# Patient Record
Sex: FEMALE | Race: WHITE | NOT HISPANIC OR LATINO | Employment: PART TIME
[De-identification: names, ages, dates, MRNs, and addresses within clinical notes are randomized per-mention and may not be internally consistent; named-entity substitution may affect disease eponyms.]

---

## 2017-02-19 ENCOUNTER — HOSPITAL ENCOUNTER (OUTPATIENT)
Dept: ADMINISTRATIVE | Facility: OTHER | Age: 37
Discharge: HOME | End: 2017-02-19
Attending: FAMILY MEDICINE | Admitting: FAMILY MEDICINE

## 2017-02-19 ASSESSMENT — MIFFLIN-ST. JEOR: SCORE: 1554.05

## 2020-01-18 ENCOUNTER — OFFICE VISIT (OUTPATIENT)
Dept: URGENT CARE | Facility: URGENT CARE | Age: 40
End: 2020-01-18
Payer: COMMERCIAL

## 2020-01-18 VITALS
RESPIRATION RATE: 17 BRPM | WEIGHT: 192 LBS | OXYGEN SATURATION: 97 % | TEMPERATURE: 98 F | DIASTOLIC BLOOD PRESSURE: 79 MMHG | HEART RATE: 81 BPM | SYSTOLIC BLOOD PRESSURE: 122 MMHG

## 2020-01-18 DIAGNOSIS — L50.9 HIVES: Primary | ICD-10-CM

## 2020-01-18 PROCEDURE — 99203 OFFICE O/P NEW LOW 30 MIN: CPT | Performed by: STUDENT IN AN ORGANIZED HEALTH CARE EDUCATION/TRAINING PROGRAM

## 2020-01-18 RX ORDER — DIAPER,BRIEF,INFANT-TODD,DISP
EACH MISCELLANEOUS
Qty: 56 G | Refills: 0 | Status: SHIPPED | OUTPATIENT
Start: 2020-01-18

## 2020-01-18 RX ORDER — PREDNISONE 20 MG/1
40 TABLET ORAL DAILY
Qty: 10 TABLET | Refills: 0 | Status: SHIPPED | OUTPATIENT
Start: 2020-01-18 | End: 2020-01-23

## 2020-01-18 RX ORDER — CETIRIZINE HYDROCHLORIDE 10 MG/1
10 TABLET ORAL DAILY
COMMUNITY

## 2020-01-18 NOTE — PROGRESS NOTES
"SUBJECTIVE:   Theresa Estes is a 39 year old female presenting with a chief complaint of   Chief Complaint   Patient presents with     Urgent Care     Hives     Pt states she has been having hives since 01/05/2020 patient states she saw her primary pcp on Monday but did not get any medication did change some items she uses but now they are getting worst all over body , \"stiff\" , red, raised no breathing issues. Pt is also 6 weeks post-partum.      40 yo female who is 6 weeks post partum here for evaluation of hives. Itching started on 1/5/2020 and then hives appeared. She saw her PCP for this issue this past week on Monday at which time she was started on Zyrtec. They discussed steroids but decided against this due to her breast feeding status and symptoms were not too severe. Yesterday, she noted that the hives seemed more red and raised and she now has some swelling on her left arm where the hives are worst. She has been having ongoing itching. No pain. No fever, chills, nausea, vomiting, diarrhea, stomach upset, lip/mouth/throat swelling, difficulty breathing, wheezing. She has never had anaphylaxis before. No history of hives previously. She has seasonal allergies and allergy to hazelnuts. She has been taking Zyrtec, oatmeal baths, topical aloe vera and topical Benadryl. Rash is present on her chest, arms and legs. She cannot recall a specific trigger. Denies any recent viral symptoms, new OTC medications, clothing. She did have a small amount of tree nut in a piece of candy in December but states that her typical reaction to this is mild. She also had a course of cefdinir before her symptoms started and wonders if this could have been the cause. She had allergy testing (skin prick) several years ago that she says was unremarkable.    A pertinent 10-point ROS was negative unless otherwise specified in the HPI.     History reviewed. No pertinent past medical history.  History reviewed. No pertinent family " history.  Current Outpatient Medications   Medication Sig Dispense Refill     cetirizine (ZYRTEC) 10 MG tablet Take 10 mg by mouth daily       hydrocortisone (CORTAID) 0.5 % external cream Apply to rash twice daily for up to 2 weeks as needed for rash. 56 g 0     predniSONE (DELTASONE) 20 MG tablet Take 2 tablets (40 mg) by mouth daily for 5 days 10 tablet 0     Prenatal Vit-DSS-Fe Cbn-FA (PRENATAL AD PO)        VITAMIN D, ERGOCALCIFEROL, PO        Social History     Tobacco Use     Smoking status: Never Smoker     Smokeless tobacco: Never Used   Substance Use Topics     Alcohol use: Not on file       OBJECTIVE  /79   Pulse 81   Temp 98  F (36.7  C) (Oral)   Resp 17   Wt 87.1 kg (192 lb)   SpO2 97%     GENERAL: No acute distress, non-toxic appearing  HEAD: Atraumatic, normocephalic  EYES: PERRL, EOMI, no scleral or conjunctival injection, anicteric  NOSE: Septum midline, no discharge  THROAT: Moist mucous membranes, oropharynx is patent, no tonsillar swelling, no mucosal swelling, no exudate or asymmetry, no oral lesions, voice is clear, no trismus  NECK: Supple with full ROM, trachea midline  CV: Regular rate and rhythm, no murmurs or rubs  LUNGS: Respirations unlabored, no wheezes, crackles or rales  SKIN:  Diffuse erythematous papular rash with wheals on her bilateral arms and legs and some diffuse subdermal swelling of the right forearm associated with urticaria   NEURO: Alert, coherent, interacts appropriately, no gross neurologic deficits  PSYCH: euthymic, normal affect, thought content is appropriate     Labs:  No results found for this or any previous visit (from the past 24 hour(s)).    ASSESSMENT & PLAN:      ICD-10-CM    1. Hives L50.9 predniSONE (DELTASONE) 20 MG tablet     hydrocortisone (CORTAID) 0.5 % external cream      38 yo postpartum, otherwise healthy female here for 2.5 weeks of hives recently evaluated by her PCP. She comes in today for worsening rash and some swelling under rash on  her right forearm. She is already taking Zyrtec. No symptoms or physical exam findings concerning for anaphylaxis and no prior history/known triggers for this. She does report history of allergy to tree nut that is mild. Unclear trigger for current symptoms although may be secondary to cefdinir and therefore I am adding that to her allergen list today. I do not think she needs epi-pen given the above history. We will treat with a prednisone burst and she already has follow up scheduled with her PCP. Continue Zyrtec and can try Benadryl as needed. Discussed risks of use with breast feeding and she will pump or feed formula with use of the medication. Discussed immediate evaluation in case of worsening symptoms or new shortness of breath, wheeze, GI symptoms, or mucosal involvement.     Followup: with PCP on Tuesday.    Options for treatment and/or follow-up care were reviewed with the patient who was engaged and actively involved in the decision making process and verbalized understanding of the options discussed and was satisfied with the final plan.       Marizol Reyes MD      Patient Instructions   Patient Education     Hives (Adult)  Hives are pink or red bumps on the skin. These bumps are also known as wheals. The bumps can itch, burn, or sting. Hives can occur anywhere on the body. They vary in size and shape and can form in clusters. Individual hives can appear and go away quickly. New hives may develop as old ones fade. Hives are common and usually harmless. Occasionally hives are a sign of a serious allergy.  Hives are often caused by an allergic reaction. It may be an allergic reaction to foods such as fruit, shellfish, chocolate, nuts, or tomatoes. It may be a reaction to pollens, animal fur, or mold spores. Medicines, chemicals, and insect bites can also cause hives. And hives can be caused by hot sun or cold air. The cause of hives can be difficult to find.  You may be given medicines to relieve  swelling and itching. Follow all instructions when using these medicines. The hives will usually fade in a few days, but can last up to 2 weeks.  Home care  Follow these tips:    Try to find the cause of the hives and eliminate it. Discuss possible causes with your healthcare provider. Future reactions to the same allergen may be worse.    Don t scratch the hives. Scratching will delay healing. To reduce itching, apply cool, wet compresses to the skin.    Dress in soft, loose cotton clothing.    Don t bathe in hot water. This can make the itching worse.    Apply an ice pack or cool pack wrapped in a thin towel to your skin. This will help reduce redness and itching. But if your hives were caused by exposure to cold, then do not apply more cold to them.    You may use over-the counter antihistamines to reduce itching. Some older antihistamines, such as diphenhydramine and chlorpheniramine, are inexpensive. But they need to be taken often and may make you sleepy. They are best used at bedtime. Don t use diphenhydramine if you have glaucoma or have trouble urinating because of an enlarged prostate. Newer antihistamines, such as loratadine, cetirizine, and fexofenadine, are generally more expensive. But they tend to have fewer side effects, such as drowsiness. They can be taken less often.    Another type of antihistamine is used to treat heartburn. This type includes ranitidine, nizatidine, famotidine, and cimetidine. These are sometimes used along with the above antihistamines if a single medicine is not working.  Follow-up care  Follow up with your healthcare provider if your symptoms don't get better in 2 days. Ask your provider about allergy testing if you have had a severe reaction, or have had several episodes of hives. He or she can use the allergy testing to find out what you are allergic to.  When to seek medical advice  Call your healthcare provider right away if any of these occur:    Fever of 100.4 F  (38.0 C) or higher, or as directed by your healthcare provider    Redness, swelling, or pain    Foul-smelling fluid coming from the rash  Call 911  Call 911 if any of the following occur:    Swelling of the face, throat, or tongue    Trouble breathing or swallowing    Dizziness, weakness, or fainting  Date Last Reviewed: 9/1/2016 2000-2019 The [employer]. [address] [place] [address]. All rights reserved. This information is not intended as a substitute for professional medical care. Always follow your healthcare professional's instructions.

## 2020-01-18 NOTE — PATIENT INSTRUCTIONS
Patient Education     Hives (Adult)  Hives are pink or red bumps on the skin. These bumps are also known as wheals. The bumps can itch, burn, or sting. Hives can occur anywhere on the body. They vary in size and shape and can form in clusters. Individual hives can appear and go away quickly. New hives may develop as old ones fade. Hives are common and usually harmless. Occasionally hives are a sign of a serious allergy.  Hives are often caused by an allergic reaction. It may be an allergic reaction to foods such as fruit, shellfish, chocolate, nuts, or tomatoes. It may be a reaction to pollens, animal fur, or mold spores. Medicines, chemicals, and insect bites can also cause hives. And hives can be caused by hot sun or cold air. The cause of hives can be difficult to find.  You may be given medicines to relieve swelling and itching. Follow all instructions when using these medicines. The hives will usually fade in a few days, but can last up to 2 weeks.  Home care  Follow these tips:    Try to find the cause of the hives and eliminate it. Discuss possible causes with your healthcare provider. Future reactions to the same allergen may be worse.    Don t scratch the hives. Scratching will delay healing. To reduce itching, apply cool, wet compresses to the skin.    Dress in soft, loose cotton clothing.    Don t bathe in hot water. This can make the itching worse.    Apply an ice pack or cool pack wrapped in a thin towel to your skin. This will help reduce redness and itching. But if your hives were caused by exposure to cold, then do not apply more cold to them.    You may use over-the counter antihistamines to reduce itching. Some older antihistamines, such as diphenhydramine and chlorpheniramine, are inexpensive. But they need to be taken often and may make you sleepy. They are best used at bedtime. Don t use diphenhydramine if you have glaucoma or have trouble urinating because of an enlarged prostate. Newer  antihistamines, such as loratadine, cetirizine, and fexofenadine, are generally more expensive. But they tend to have fewer side effects, such as drowsiness. They can be taken less often.    Another type of antihistamine is used to treat heartburn. This type includes ranitidine, nizatidine, famotidine, and cimetidine. These are sometimes used along with the above antihistamines if a single medicine is not working.  Follow-up care  Follow up with your healthcare provider if your symptoms don't get better in 2 days. Ask your provider about allergy testing if you have had a severe reaction, or have had several episodes of hives. He or she can use the allergy testing to find out what you are allergic to.  When to seek medical advice  Call your healthcare provider right away if any of these occur:    Fever of 100.4 F (38.0 C) or higher, or as directed by your healthcare provider    Redness, swelling, or pain    Foul-smelling fluid coming from the rash  Call 911  Call 911 if any of the following occur:    Swelling of the face, throat, or tongue    Trouble breathing or swallowing    Dizziness, weakness, or fainting  Date Last Reviewed: 9/1/2016 2000-2019 [employer]. [address] [place] [address], [place] [address]. All rights reserved. This information is not intended as a substitute for professional medical care. Always follow your healthcare professional's instructions.

## 2021-05-30 VITALS — WEIGHT: 196 LBS | HEIGHT: 64 IN | BODY MASS INDEX: 33.46 KG/M2

## 2021-06-08 NOTE — PROGRESS NOTES
Phone update given to Dr Knapp that Pt tolerating ice chips and sprite PO, VSS, and uterine ctxs vary from q 5 to 13 min and pt reports ctxs very mild and she does not feel all ctxs.Orders received to bolus remaining 700ml of LR and recheck UA.

## 2021-06-08 NOTE — PROGRESS NOTES
Discharge and Follow up instructions reviewed with Pt.Pt verbalizes understands. Dischagred to home ambulatory with .

## 2021-06-08 NOTE — PROGRESS NOTES
[id number] Phone update to Dr Knapp of urinalysis results and pt having occasional ctxs but not feeling them all and just occasionally feeling mild discomfort. Orders to discharge received.

## 2021-06-16 PROBLEM — Z34.90 PREGNANT: Status: ACTIVE | Noted: 2019-12-08

## 2021-06-16 PROBLEM — O99.820 GBS (GROUP B STREPTOCOCCUS CARRIER), +RV CULTURE, CURRENTLY PREGNANT: Status: ACTIVE | Noted: 2019-12-08

## 2021-06-16 PROBLEM — O99.119 THROMBOCYTOPENIA DURING PREGNANCY (H): Status: ACTIVE | Noted: 2019-12-08

## 2021-06-16 PROBLEM — O26.899 RH NEGATIVE STATUS DURING PREGNANCY: Status: ACTIVE | Noted: 2019-12-08

## 2021-06-16 PROBLEM — Z67.91 RH NEGATIVE STATUS DURING PREGNANCY: Status: ACTIVE | Noted: 2019-12-08

## 2021-06-16 PROBLEM — D69.6 THROMBOCYTOPENIA DURING PREGNANCY (H): Status: ACTIVE | Noted: 2019-12-08

## 2022-05-03 ENCOUNTER — HOSPITAL ENCOUNTER (EMERGENCY)
Facility: CLINIC | Age: 42
Discharge: HOME | End: 2022-05-03
Attending: EMERGENCY MEDICINE | Admitting: EMERGENCY MEDICINE
Payer: COMMERCIAL

## 2022-05-03 VITALS
HEIGHT: 65 IN | TEMPERATURE: 99.1 F | BODY MASS INDEX: 27.16 KG/M2 | DIASTOLIC BLOOD PRESSURE: 101 MMHG | HEART RATE: 103 BPM | WEIGHT: 163 LBS | SYSTOLIC BLOOD PRESSURE: 147 MMHG | RESPIRATION RATE: 18 BRPM | OXYGEN SATURATION: 100 %

## 2022-05-03 DIAGNOSIS — H92.01 RIGHT EAR PAIN: ICD-10-CM

## 2022-05-03 DIAGNOSIS — H66.90 ACUTE OTITIS MEDIA, UNSPECIFIED OTITIS MEDIA TYPE: ICD-10-CM

## 2022-05-03 PROCEDURE — 99283 EMERGENCY DEPT VISIT LOW MDM: CPT

## 2022-05-03 PROCEDURE — 250N000013 HC RX MED GY IP 250 OP 250 PS 637: Performed by: EMERGENCY MEDICINE

## 2022-05-03 RX ORDER — IBUPROFEN 600 MG/1
600 TABLET, FILM COATED ORAL ONCE
Status: COMPLETED | OUTPATIENT
Start: 2022-05-03 | End: 2022-05-03

## 2022-05-03 RX ADMIN — IBUPROFEN 600 MG: 600 TABLET ORAL at 22:53

## 2022-05-03 RX ADMIN — AMOXICILLIN AND CLAVULANATE POTASSIUM 1 TABLET: 875; 125 TABLET, FILM COATED ORAL at 22:53

## 2022-05-03 ASSESSMENT — ENCOUNTER SYMPTOMS
VOMITING: 0
COUGH: 1
CHILLS: 0
NAUSEA: 0
FEVER: 0
SORE THROAT: 0

## 2022-05-04 NOTE — DISCHARGE INSTRUCTIONS
You were seen in the Emergency Department today for ear pain.      You are being sent with a prescription for augmentin (antibiotic). Please take as directed.     For Pain and/or Fever:  - You can take 600mg of Ibuprofen (Motrin, Advil) by mouth with food every 6-8 hours (no more than 3200mg in 24hrs).    - You may also take 650-1000mg of Acetaminophen (Tylenol) along with the Ibuprofen.  Please do not use more than 3000 mg in a 24 hour period. Tylenol is an effective drug when taken at the prescribed dosages but can cause bodily injury including liver damage if taken too often or at too high of dose.  - You can take one or the other every 3 hours while awake (such that each is taken every 6 hours). For example, if you take Tylenol when you get home then you would take ibuprofen 3 hours later followed by another dose of Tylenol 3 hours after that. Write down the times you are taking both medications to ensure appropriate time in between doses.       Please return to the ER if you experience fever, inability to keep fluids/medications down, and/or for any other new or concerning symptoms, otherwise please follow up with your primary doctor as needed days for recheck.     Below is some information you might find useful.     Thank you for choosing [facility]. It was a pleasure taking care of you today!  - Dr. Rhea Agarwal

## 2022-05-04 NOTE — ED PROVIDER NOTES
EMERGENCY DEPARTMENT ENCOUNTER      NAME: Theresa Estes  YOB: 1980  MRN: [id number]      FINAL IMPRESSION  1. Right ear pain    2. Acute otitis media, unspecified otitis media type        MEDICAL DECISION MAKING   Pertinent Labs & Imaging studies reviewed. (See chart for details)    Theresa Estes is a 41 year old female who presents for evaluation of right ear pain.  Patient reports onset of right ear pain last night, with worsening today.  She has had some recent nasal congestion and a mild cough but no other associated symptoms.  She notes that her children have recently had rhinorrhea but she otherwise has no sick contacts.  She does not have a history of recurrent ear infections and has no complaints of fever, chills, sore throat, vomiting, or other new signs or symptoms.  She has not been swimming recently and does not believe that there is any fluid in her ear. Vitals on arrival stable. Remainder of history and exam, as below.     On exam, patient has erythema suggestive of acute otitis media.  No erythema, inflammation, or tenderness with manipulation of auricle/tragus suggest external otitis.  No tenderness to palpation of mastoid to suggest mastoiditis.  Oropharynx is clear without exudates or erythema to suggest strep pharyngitis.  No systemic signs or symptoms to suggest sepsis, bacteremia, or deep space infection.  Vitals on arrival stable and reassuring.  Patient is able to tolerate p.o. without difficulty and is hemodynamically stable.  Overall, history and exam is most suggestive of acute otitis media.  We will plan to give first dose of antibiotics here as well as a dose of Tylenol/motrin for discomfort and send with a prescription for the former.      Patient given first dose of Augmentin here which she tolerated well.  The importance of close follow up was discussed. I instructed Ms. Estes to follow-up with her primary care provider. We reviewed warning signs and symptoms, and I  instructed Ms. Estes to return to the emergency department immediately if she develops any new or worsening symptoms. I provided additional verbal discharge instructions. Ms. Estes expressed understanding and agreement with this plan of care, her questions were answered, and she was discharged in stable condition.        ED COURSE  10:41 PM I met with the patient, obtained history, performed an initial exam, and discussed options and plan for diagnostics and treatment here in the ED. We discussed the plan for discharge and the patient is agreeable. Reviewed supportive cares, symptomatic treatment, outpatient follow up, and reasons to return to the Emergency Department. Patient to be discharged by ED RN.       MEDICATIONS GIVEN IN THE ED  Medications   amoxicillin-clavulanate (AUGMENTIN) 875-125 MG per tablet 1 tablet (1 tablet Oral Given 5/3/22 2253)   ibuprofen (ADVIL/MOTRIN) tablet 600 mg (600 mg Oral Given 5/3/22 2253)       NEW PRESCRIPTIONS STARTED AT TODAY'S VISIT  Discharge Medication List as of 5/3/2022 10:48 PM             =================================================================    Chief Complaint   Patient presents with     Otalgia         HPI:    Patient information was obtained from: Patient    Use of : N/A     Theresa Estes is a 41 year old female with no pertinent history who presents to the ED via walk-in for the evaluation of otalgia.    Patient reports right ear pain that started last night, which worsened today. She notes that a couple days ago, she had some nasal congestion and an associating cough. Patient states she did rinse out her sinuses at that time. She notes that her kids have had runny noses recently. Patient has taken Tylenol for pain with relief for about 1.5 hours, with her last dose of two regular strength two hours ago. Otherwise, she denies any sore throat, fever, chills, nausea, vomiting, and rashes. No other complaints at this time.     RELEVANT HISTORY,  "MEDICATIONS, & ALLERGIES   Past medical history, surgical history, family history, medications, and allergies reviewed and pertinent noted in HPI. See end of note for comprehensive list.    REVIEW OF SYSTEMS:  Review of Systems   Constitutional: Negative for chills and fever.   HENT: Positive for congestion (nasal) and ear pain (right). Negative for sore throat.    Respiratory: Positive for cough.    Gastrointestinal: Negative for nausea and vomiting.   Skin: Negative for rash.   All other systems reviewed and are negative.    PHYSICAL EXAM:    Vitals: BP (!) 147/101   Pulse 103   Temp 99.1  F (37.3  C) (Oral)   Resp 18   Ht 1.638 m (5' 4.5\")   Wt 73.9 kg (163 lb)   SpO2 100%   BMI 27.55 kg/m    General: Awake, alert, interactive.   Eyes: PERRL.   HENT: Atraumatic. MMM.  Oropharynx clear without exudates or erythema.  Left TM clear.  Right TM erythematous and slightly retracted with mild erythema of external auditory canal.  No pain with manipulation of tragus.  No mastoid tenderness.  Neck: Full AROM.  Cardiovascular: Regular rate.  Respiratory/Chest: Normal work of breathing.   Abdomen: Non-distended.   Musculoskeletal: Normal appearing extremities without obvious deformities or signs of trauma.   Skin: Normal color. No rash or diaphoresis.  Neurologic: Alert, oriented. Speech clear. CN's grossly intact. Moving all extremities spontaneously.   Psychiatric: Normal affect/mood.        Comprehensive outline of EPIC chart Hx  PAST MEDICAL HISTORY    History reviewed. No pertinent past medical history.  Past Surgical History:   Procedure Laterality Date     WISDOM TOOTH EXTRACTION  2000       CURRENT MEDICATIONS    Current Outpatient Medications   Medication Instructions     cetirizine (ZYRTEC) 10 mg, Oral, DAILY     hydrocortisone (CORTAID) 0.5 % external cream Apply to rash twice daily for up to 2 weeks as needed for rash.     Prenatal Vit-DSS-Fe Cbn-FA (PRENATAL AD PO) Oral     VITAMIN D, ERGOCALCIFEROL, PO " Oral       ALLERGIES    Allergies   Allergen Reactions     Food [Gluten Meal]      Neomycin Other (See Comments)     Growth in Ear     Nuts        FAMILY HISTORY    History reviewed. No pertinent family history.    SOCIAL HISTORY    Social History     Socioeconomic History     Marital status:    Tobacco Use     Smoking status: Never Smoker     Smokeless tobacco: Never Used       I, Yun Small, am serving as a scribe to document services personally performed by Dr. Rhea Agarwal based on my observation and the provider's statements to me. I, Rhea Agarwal MD attest that Yun Small is acting in a scribe capacity, has observed my performance of the services and has documented them in accordance with my direction.    Rhea Agarwal M.D.  Emergency Medicine  [facility] EMERGENCY ROOM  [address] [facility] [address] [phone]  Dept: [phone]     Rhea Agarwal MD  05/04/22 0153

## 2022-05-04 NOTE — ED TRIAGE NOTES
Pt arrives with complaints of right ear pain. Pain started yesterday evening and pain got worse the last few hours tonight. Took Tylenol PTA with little pain relief. Having nasal congestion at night.      Triage Assessment     Row Name 05/03/22 2102       Triage Assessment (Adult)    Airway WDL WDL       Respiratory WDL    Respiratory WDL WDL       Skin Circulation/Temperature WDL    Skin Circulation/Temperature WDL WDL       Cardiac WDL    Cardiac WDL WDL       Peripheral/Neurovascular WDL    Peripheral Neurovascular WDL WDL       Cognitive/Neuro/Behavioral WDL    Cognitive/Neuro/Behavioral WDL WDL